# Patient Record
Sex: MALE | URBAN - METROPOLITAN AREA
[De-identification: names, ages, dates, MRNs, and addresses within clinical notes are randomized per-mention and may not be internally consistent; named-entity substitution may affect disease eponyms.]

---

## 2020-11-13 ENCOUNTER — HOSPITAL ENCOUNTER (OUTPATIENT)
Dept: LAB | Age: 30
Discharge: HOME | End: 2020-11-13
Payer: COMMERCIAL

## 2020-11-13 DIAGNOSIS — Z20.822 ENCOUNTER FOR SCREENING LABORATORY TESTING FOR COVID-19 VIRUS: ICD-10-CM

## 2020-11-13 PROCEDURE — 87635 SARS-COV-2 COVID-19 AMP PRB: CPT

## 2020-11-14 LAB
SARS COV-2, XPGCVT: NEGATIVE
SOURCE, COVRS: NORMAL

## 2023-06-19 ENCOUNTER — OFFICE VISIT (OUTPATIENT)
Dept: FAMILY MEDICINE CLINIC | Facility: CLINIC | Age: 33
End: 2023-06-19
Payer: COMMERCIAL

## 2023-06-19 VITALS
SYSTOLIC BLOOD PRESSURE: 128 MMHG | HEART RATE: 63 BPM | HEIGHT: 70 IN | TEMPERATURE: 97.3 F | OXYGEN SATURATION: 98 % | BODY MASS INDEX: 33.11 KG/M2 | WEIGHT: 231.3 LBS | RESPIRATION RATE: 18 BRPM | DIASTOLIC BLOOD PRESSURE: 85 MMHG

## 2023-06-19 DIAGNOSIS — Z11.59 NEED FOR HEPATITIS C SCREENING TEST: ICD-10-CM

## 2023-06-19 DIAGNOSIS — Z00.00 WELL ADULT HEALTH CHECK: Primary | ICD-10-CM

## 2023-06-19 DIAGNOSIS — Z13.220 LIPID SCREENING: ICD-10-CM

## 2023-06-19 DIAGNOSIS — E66.09 CLASS 1 OBESITY DUE TO EXCESS CALORIES WITHOUT SERIOUS COMORBIDITY WITH BODY MASS INDEX (BMI) OF 33.0 TO 33.9 IN ADULT: ICD-10-CM

## 2023-06-19 DIAGNOSIS — Z82.49 FAMILY HISTORY OF CORONARY ARTERY DISEASE IN FATHER: ICD-10-CM

## 2023-06-19 DIAGNOSIS — Z11.4 ENCOUNTER FOR SCREENING FOR HIV: ICD-10-CM

## 2023-06-19 DIAGNOSIS — L70.0 ACNE VULGARIS: ICD-10-CM

## 2023-06-19 DIAGNOSIS — L73.2 AXILLARY HIDRADENITIS SUPPURATIVA: ICD-10-CM

## 2023-06-19 DIAGNOSIS — L05.91 PILONIDAL CYST: ICD-10-CM

## 2023-06-19 PROCEDURE — 99385 PREV VISIT NEW AGE 18-39: CPT | Performed by: FAMILY MEDICINE

## 2023-06-19 SDOH — ECONOMIC STABILITY: FOOD INSECURITY: WITHIN THE PAST 12 MONTHS, THE FOOD YOU BOUGHT JUST DIDN'T LAST AND YOU DIDN'T HAVE MONEY TO GET MORE.: NEVER TRUE

## 2023-06-19 SDOH — ECONOMIC STABILITY: FOOD INSECURITY: WITHIN THE PAST 12 MONTHS, YOU WORRIED THAT YOUR FOOD WOULD RUN OUT BEFORE YOU GOT MONEY TO BUY MORE.: NEVER TRUE

## 2023-06-19 SDOH — ECONOMIC STABILITY: HOUSING INSECURITY
IN THE LAST 12 MONTHS, WAS THERE A TIME WHEN YOU DID NOT HAVE A STEADY PLACE TO SLEEP OR SLEPT IN A SHELTER (INCLUDING NOW)?: NO

## 2023-06-19 SDOH — ECONOMIC STABILITY: INCOME INSECURITY: HOW HARD IS IT FOR YOU TO PAY FOR THE VERY BASICS LIKE FOOD, HOUSING, MEDICAL CARE, AND HEATING?: NOT HARD AT ALL

## 2023-06-19 ASSESSMENT — PATIENT HEALTH QUESTIONNAIRE - PHQ9
SUM OF ALL RESPONSES TO PHQ QUESTIONS 1-9: 0
2. FEELING DOWN, DEPRESSED OR HOPELESS: 0
SUM OF ALL RESPONSES TO PHQ QUESTIONS 1-9: 0
SUM OF ALL RESPONSES TO PHQ9 QUESTIONS 1 & 2: 0
1. LITTLE INTEREST OR PLEASURE IN DOING THINGS: 0

## 2023-06-19 NOTE — PROGRESS NOTES
[facility] [facility]  Phone: [phone] Fax [phone]  Ny Cochran M.D.  6/19/2023        Elie Bartholomew is a 29 y.o. male     HPI:  Patient is seen for New Patient (Get established,)  Patient comes in today to establish himself as a new patient and for physical exam.  He recently saw his dermatologist, Dr. Shahriar Contreras, and has been provided some medication for acne that has mostly been of the neck and chest and back in the past but has also developed more consistently in the axillary regions. Has tender nodularity in the right axilla. Does not describe any exudative weeping recently. Does describe having been treated in the past with Accutane on a couple of occasions. Not currently being treated with such. He does have an appointment with a general surgeon next month as he has had persistence of a pilonidal cyst with dermatology referring him for excision of this. He reports otherwise being fairly healthy. He has noticed some weight gain over the last few years. He now has a 1month-old son and has been encouraged by his wife to have more regular checkups. No current routine exercise although walks a good bit with work. Additionally has felt some need to have more frequent evaluation secondary to his father having passed with myocardial infarction this past December in his early 62s. ROS:  Constitutional: denies excessive fatigue; no fever or chills; no polydipsia polyphagia or polyuria  HEENT: no nasal pressure or drainage, no otalgia or tinnitus or decreased hearing   Pulmonary: no dyspnea cough or wheeze  Cardiac: no chest pain or palpitations, no tachycardia, no PND or orthopnea.   GI: no recurrent dyspepsia or reflux unless he drinks wine in the evenings; normal bowel movements, no nausea or vomiting or diarrhea, no hematochezia or melena, no abdominal pain  : normal urination without dysuria or hematuria, nocturia or

## 2023-07-11 ENCOUNTER — NURSE ONLY (OUTPATIENT)
Dept: FAMILY MEDICINE CLINIC | Facility: CLINIC | Age: 33
End: 2023-07-11

## 2023-07-11 DIAGNOSIS — E66.09 CLASS 1 OBESITY DUE TO EXCESS CALORIES WITHOUT SERIOUS COMORBIDITY WITH BODY MASS INDEX (BMI) OF 33.0 TO 33.9 IN ADULT: ICD-10-CM

## 2023-07-11 DIAGNOSIS — Z00.00 WELL ADULT HEALTH CHECK: ICD-10-CM

## 2023-07-11 DIAGNOSIS — Z82.49 FAMILY HISTORY OF CORONARY ARTERY DISEASE IN FATHER: ICD-10-CM

## 2023-07-11 DIAGNOSIS — Z11.4 ENCOUNTER FOR SCREENING FOR HIV: ICD-10-CM

## 2023-07-11 DIAGNOSIS — L70.0 ACNE VULGARIS: ICD-10-CM

## 2023-07-11 DIAGNOSIS — L05.91 PILONIDAL CYST: ICD-10-CM

## 2023-07-11 DIAGNOSIS — Z11.59 NEED FOR HEPATITIS C SCREENING TEST: ICD-10-CM

## 2023-07-11 DIAGNOSIS — Z13.220 LIPID SCREENING: ICD-10-CM

## 2023-07-11 DIAGNOSIS — L73.2 AXILLARY HIDRADENITIS SUPPURATIVA: ICD-10-CM

## 2023-07-11 LAB
ALBUMIN SERPL-MCNC: 3.7 G/DL (ref 3.5–5)
ALBUMIN/GLOB SERPL: 1 (ref 0.4–1.6)
ALP SERPL-CCNC: 63 U/L (ref 50–136)
ALT SERPL-CCNC: 51 U/L (ref 12–65)
ANION GAP SERPL CALC-SCNC: 5 MMOL/L (ref 2–11)
AST SERPL-CCNC: 28 U/L (ref 15–37)
BASOPHILS # BLD: 0 K/UL (ref 0–0.2)
BASOPHILS NFR BLD: 0 % (ref 0–2)
BILIRUB SERPL-MCNC: 1.6 MG/DL (ref 0.2–1.1)
BUN SERPL-MCNC: 12 MG/DL (ref 6–23)
CALCIUM SERPL-MCNC: 8.8 MG/DL (ref 8.3–10.4)
CHLORIDE SERPL-SCNC: 108 MMOL/L (ref 101–110)
CHOLEST SERPL-MCNC: 192 MG/DL
CO2 SERPL-SCNC: 22 MMOL/L (ref 21–32)
CREAT SERPL-MCNC: 1.3 MG/DL (ref 0.8–1.5)
DIFFERENTIAL METHOD BLD: NORMAL
EOSINOPHIL # BLD: 0.1 K/UL (ref 0–0.8)
EOSINOPHIL NFR BLD: 2 % (ref 0.5–7.8)
ERYTHROCYTE [DISTWIDTH] IN BLOOD BY AUTOMATED COUNT: 13.2 % (ref 11.9–14.6)
GLOBULIN SER CALC-MCNC: 3.8 G/DL (ref 2.8–4.5)
GLUCOSE SERPL-MCNC: 95 MG/DL (ref 65–100)
HCT VFR BLD AUTO: 47.7 % (ref 41.1–50.3)
HDLC SERPL-MCNC: 41 MG/DL (ref 40–60)
HDLC SERPL: 4.7
HGB BLD-MCNC: 15.7 G/DL (ref 13.6–17.2)
IMM GRANULOCYTES # BLD AUTO: 0 K/UL (ref 0–0.5)
IMM GRANULOCYTES NFR BLD AUTO: 1 % (ref 0–5)
LDLC SERPL CALC-MCNC: 122.4 MG/DL
LYMPHOCYTES # BLD: 1.3 K/UL (ref 0.5–4.6)
LYMPHOCYTES NFR BLD: 27 % (ref 13–44)
MCH RBC QN AUTO: 28.5 PG (ref 26.1–32.9)
MCHC RBC AUTO-ENTMCNC: 32.9 G/DL (ref 31.4–35)
MCV RBC AUTO: 86.7 FL (ref 82–102)
MONOCYTES # BLD: 0.6 K/UL (ref 0.1–1.3)
MONOCYTES NFR BLD: 12 % (ref 4–12)
NEUTS SEG # BLD: 2.9 K/UL (ref 1.7–8.2)
NEUTS SEG NFR BLD: 58 % (ref 43–78)
NRBC # BLD: 0 K/UL (ref 0–0.2)
PLATELET # BLD AUTO: 313 K/UL (ref 150–450)
PMV BLD AUTO: 9.9 FL (ref 9.4–12.3)
POTASSIUM SERPL-SCNC: 3.9 MMOL/L (ref 3.5–5.1)
PROT SERPL-MCNC: 7.5 G/DL (ref 6.3–8.2)
RBC # BLD AUTO: 5.5 M/UL (ref 4.23–5.6)
SODIUM SERPL-SCNC: 135 MMOL/L (ref 133–143)
TRIGL SERPL-MCNC: 143 MG/DL (ref 35–150)
TSH, 3RD GENERATION: 1.87 UIU/ML (ref 0.36–3.74)
VLDLC SERPL CALC-MCNC: 28.6 MG/DL (ref 6–23)
WBC # BLD AUTO: 4.9 K/UL (ref 4.3–11.1)

## 2023-07-12 ENCOUNTER — OFFICE VISIT (OUTPATIENT)
Dept: SURGERY | Age: 33
End: 2023-07-12
Payer: COMMERCIAL

## 2023-07-12 VITALS
OXYGEN SATURATION: 98 % | WEIGHT: 230 LBS | BODY MASS INDEX: 32.93 KG/M2 | RESPIRATION RATE: 16 BRPM | HEIGHT: 70 IN | HEART RATE: 73 BPM | SYSTOLIC BLOOD PRESSURE: 129 MMHG | DIASTOLIC BLOOD PRESSURE: 72 MMHG

## 2023-07-12 DIAGNOSIS — L02.31 CELLULITIS AND ABSCESS OF BUTTOCK: ICD-10-CM

## 2023-07-12 DIAGNOSIS — L05.91 PILONIDAL CYST: Primary | ICD-10-CM

## 2023-07-12 DIAGNOSIS — L03.317 CELLULITIS AND ABSCESS OF BUTTOCK: ICD-10-CM

## 2023-07-12 LAB
HCV AB SER QL: NONREACTIVE
HIV 1+2 AB+HIV1 P24 AG SERPL QL IA: NONREACTIVE
HIV 1/2 RESULT COMMENT: NORMAL

## 2023-07-12 PROCEDURE — 99204 OFFICE O/P NEW MOD 45 MIN: CPT | Performed by: SURGERY

## 2023-07-12 NOTE — PROGRESS NOTES
H&P/Consult Note/Progress Note/Office Note:   Machelle Garrett  MRN: [id number]  :1990  Age:33 y.o.    HPI: Machelle Garrett is a 29 y.o. male who was referred by Dr. Nasreen Flores for evaluation of sacral pilonidal cyst and sinus disease. He reported a long history of intermittent episodes of sacral pilonidal cyst and sinus inflammation. He denies any prior incision and drainage procedures or any formal resections. He he recently noticed acute worsening of his condition with swelling    He was placed on Bactrim for acne and remains on that as of his first visit with us on 2023. Past Medical History:   Diagnosis Date    Acne     Dr. Maribel Doe     Past Surgical History:   Procedure Laterality Date    HERNIA REPAIR Bilateral      No current outpatient medications on file. No current facility-administered medications for this visit. ALLERGIES:  Patient has no known allergies. Social History     Socioeconomic History    Marital status:      Spouse name: None    Number of children: None    Years of education: None    Highest education level: None   Tobacco Use    Smoking status: Never     Passive exposure: Never    Smokeless tobacco: Never   Vaping Use    Vaping Use: Never used   Substance and Sexual Activity    Alcohol use:  Yes     Alcohol/week: 9.0 standard drinks     Types: 8 Cans of beer, 1 Shots of liquor per week    Drug use: Never    Sexual activity: Yes     Social Determinants of Health     Financial Resource Strain: Low Risk     Difficulty of Paying Living Expenses: Not hard at all   Food Insecurity: No Food Insecurity    Worried About Running Out of Food in the Last Year: Never true    Ran Out of Food in the Last Year: Never true   Transportation Needs: Unknown    Lack of Transportation (Non-Medical): No   Housing Stability: Unknown    Unstable Housing in the Last Year: No     Social History     Tobacco Use   Smoking Status Never    Passive exposure: Never   Smokeless

## 2023-07-24 ENCOUNTER — OFFICE VISIT (OUTPATIENT)
Dept: SURGERY | Age: 33
End: 2023-07-24
Payer: COMMERCIAL

## 2023-07-24 VITALS — BODY MASS INDEX: 32.93 KG/M2 | WEIGHT: 230 LBS | HEIGHT: 70 IN

## 2023-07-24 DIAGNOSIS — L02.31 CELLULITIS AND ABSCESS OF BUTTOCK: Primary | ICD-10-CM

## 2023-07-24 DIAGNOSIS — L05.91 PILONIDAL CYST: ICD-10-CM

## 2023-07-24 DIAGNOSIS — L03.317 CELLULITIS AND ABSCESS OF BUTTOCK: Primary | ICD-10-CM

## 2023-07-24 PROCEDURE — 99214 OFFICE O/P EST MOD 30 MIN: CPT | Performed by: SURGERY

## 2023-07-24 RX ORDER — AMOXICILLIN AND CLAVULANATE POTASSIUM 875; 125 MG/1; MG/1
1 TABLET, FILM COATED ORAL 2 TIMES DAILY
Qty: 28 TABLET | Refills: 1 | Status: SHIPPED | OUTPATIENT
Start: 2023-07-24 | End: 2023-08-07

## 2023-07-24 NOTE — PROGRESS NOTES
H&P/Consult Note/Progress Note/Office Note:   Mahi Faria  MRN: [id number]  :1990  Age:33 y.o.    HPI: Mahi Faria is a 29 y.o. male who was referred by Dr. Scott Potter for evaluation of sacral pilonidal cyst and sinus disease. He reported a long h/o intermittent episodes of sacral pilonidal cyst and sinus inflammation. He denied any prior I&D procedures or any prior pilonidal resections. He he recently noticed acute worsening of his condition with swelling. He was placed on Bactrim for acne and remained on that as of his first visit with us on 23. Past Medical History:   Diagnosis Date    Acne     Dr. Randall Wallis     Past Surgical History:   Procedure Laterality Date    HERNIA REPAIR Bilateral      No current outpatient medications on file. No current facility-administered medications for this visit. ALLERGIES:  Patient has no known allergies. Social History     Socioeconomic History    Marital status:    Tobacco Use    Smoking status: Never     Passive exposure: Never    Smokeless tobacco: Never   Vaping Use    Vaping Use: Never used   Substance and Sexual Activity    Alcohol use:  Yes     Alcohol/week: 9.0 standard drinks     Types: 8 Cans of beer, 1 Shots of liquor per week    Drug use: Never    Sexual activity: Yes     Social Determinants of Health     Financial Resource Strain: Low Risk     Difficulty of Paying Living Expenses: Not hard at all   Food Insecurity: No Food Insecurity    Worried About Running Out of Food in the Last Year: Never true    Ran Out of Food in the Last Year: Never true   Transportation Needs: Unknown    Lack of Transportation (Non-Medical): No   Housing Stability: Unknown    Unstable Housing in the Last Year: No     Social History     Tobacco Use   Smoking Status Never    Passive exposure: Never   Smokeless Tobacco Never     Family History   Problem Relation Age of Onset    Hypertension Mother     Diabetes Father     Parkinson's

## 2023-12-11 DIAGNOSIS — K42.9 UMBILICAL HERNIA WITHOUT OBSTRUCTION AND WITHOUT GANGRENE: Primary | ICD-10-CM

## 2023-12-11 NOTE — PROGRESS NOTES
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 He described a skin growth of the abdomen. Evaluation revealed an umbilical herniation that is fairly easily reducible but distinguishable when patient participates in a Valsalva maneuver.     I will refer him to  who continues to evaluate him regarding a pilonidal cyst.

## 2024-01-03 ENCOUNTER — OFFICE VISIT (OUTPATIENT)
Dept: SURGERY | Age: 34
End: 2024-01-03
Payer: COMMERCIAL

## 2024-01-03 VITALS
OXYGEN SATURATION: 98 % | HEART RATE: 63 BPM | HEIGHT: 70 IN | SYSTOLIC BLOOD PRESSURE: 104 MMHG | BODY MASS INDEX: 32.64 KG/M2 | WEIGHT: 228 LBS | DIASTOLIC BLOOD PRESSURE: 78 MMHG

## 2024-01-03 DIAGNOSIS — L03.317 CELLULITIS AND ABSCESS OF BUTTOCK: ICD-10-CM

## 2024-01-03 DIAGNOSIS — L73.9 FOLLICULITIS: ICD-10-CM

## 2024-01-03 DIAGNOSIS — L05.91 PILONIDAL CYST: ICD-10-CM

## 2024-01-03 DIAGNOSIS — L02.31 CELLULITIS AND ABSCESS OF BUTTOCK: ICD-10-CM

## 2024-01-03 DIAGNOSIS — K42.9 UMBILICAL HERNIA WITHOUT OBSTRUCTION AND WITHOUT GANGRENE: Primary | ICD-10-CM

## 2024-01-03 PROCEDURE — 99214 OFFICE O/P EST MOD 30 MIN: CPT | Performed by: SURGERY

## 2024-01-03 RX ORDER — AMOXICILLIN AND CLAVULANATE POTASSIUM 875; 125 MG/1; MG/1
1 TABLET, FILM COATED ORAL 2 TIMES DAILY
Qty: 28 TABLET | Refills: 0 | Status: SHIPPED | OUTPATIENT
Start: 2024-01-03 | End: 2024-01-17

## 2024-01-03 NOTE — PROGRESS NOTES
H&P/Consult Note/Progress Note/Office Note:   Kanu Collins  MRN: [id number]  :1990  Age:33 y.o.    HPI: Kanu Collins is a 33 y.o. male who was referred by Dr. Marlon Ramos for evaluation of sacral pilonidal cyst and sinus disease.      He reported a long h/o intermittent episodes of sacral pilonidal cyst and sinus inflammation.  He denied any prior I&D procedures or any prior pilonidal resections.  He he recently noticed acute worsening of his condition with swelling.    He was placed on Bactrim for acne and remained on that as of his first visit with us on 23.    23 no show for office visit  23 no show for office visit          Past Medical History:   Diagnosis Date    Acne     Dr. Ramos     Past Surgical History:   Procedure Laterality Date    HERNIA REPAIR Bilateral      No current outpatient medications on file.     No current facility-administered medications for this visit.     ALLERGIES:  Patient has no known allergies.    Social History     Socioeconomic History    Marital status:      Spouse name: None    Number of children: None    Years of education: None    Highest education level: None   Tobacco Use    Smoking status: Never     Passive exposure: Never    Smokeless tobacco: Never   Vaping Use    Vaping Use: Never used   Substance and Sexual Activity    Alcohol use: Yes     Alcohol/week: 9.0 standard drinks of alcohol     Types: 8 Cans of beer, 1 Shots of liquor per week    Drug use: Never    Sexual activity: Yes     Social Determinants of Health     Financial Resource Strain: Low Risk  (2023)    Overall Financial Resource Strain (CARDIA)     Difficulty of Paying Living Expenses: Not hard at all   Transportation Needs: Unknown (2023)    PRAPARE - Transportation     Lack of Transportation (Non-Medical): No   Housing Stability: Unknown (2023)    Housing Stability Vital Sign     Unstable Housing in the Last Year: No     Social History     Tobacco Use

## 2024-01-04 LAB
EST. AVERAGE GLUCOSE BLD GHB EST-MCNC: 100 MG/DL
HBA1C MFR BLD: 5.1 % (ref 4.8–5.6)

## 2024-01-22 ENCOUNTER — OFFICE VISIT (OUTPATIENT)
Dept: SURGERY | Age: 34
End: 2024-01-22
Payer: COMMERCIAL

## 2024-01-22 ENCOUNTER — PREP FOR PROCEDURE (OUTPATIENT)
Dept: SURGERY | Age: 34
End: 2024-01-22

## 2024-01-22 VITALS — HEIGHT: 70 IN | BODY MASS INDEX: 32.64 KG/M2 | WEIGHT: 228 LBS

## 2024-01-22 DIAGNOSIS — L98.8 PILONIDAL DISEASE: ICD-10-CM

## 2024-01-22 DIAGNOSIS — K42.9 UMBILICAL HERNIA WITHOUT OBSTRUCTION AND WITHOUT GANGRENE: Primary | ICD-10-CM

## 2024-01-22 PROCEDURE — 99214 OFFICE O/P EST MOD 30 MIN: CPT | Performed by: SURGERY

## 2024-01-22 RX ORDER — AMOXICILLIN AND CLAVULANATE POTASSIUM 875; 125 MG/1; MG/1
1 TABLET, FILM COATED ORAL 2 TIMES DAILY
Qty: 28 TABLET | Refills: 0 | Status: SHIPPED | OUTPATIENT
Start: 2024-01-22 | End: 2024-02-05

## 2024-01-22 NOTE — PROGRESS NOTES
below  *Each unique test, order, or document contributes to the combination of 2 or combination of 3 in Category 1 below.  For this visit I also reviewed old records and prior notes.      No results for input(s): \"WBC\", \"HGB\", \"PLT\", \"NA\", \"K\", \"CL\", \"CO2\", \"BUN\", \"CREA\", \"GLU\", \"INR\", \"APTT\", \"ALT\", \"AML\", \"LCAD\", \"PH\", \"PCO2\", \"PO2\", \"HCO3\" in the last 72 hours.    Invalid input(s): \"PTP\", \"TBIL\", \"TBILI\", \"CBIL\", \"SGOT\", \"GPT\", \"AP\", \"LPSE\", \"NH4\", \"TROPT\", \"TROIQ\"    Review of most recent CBC  Lab Results   Component Value Date    WBC 4.9 07/11/2023    HGB 15.7 07/11/2023    HCT 47.7 07/11/2023    MCV 86.7 07/11/2023     07/11/2023       Review of most recent BMP  Lab Results   Component Value Date/Time     07/11/2023 08:22 AM    K 3.9 07/11/2023 08:22 AM     07/11/2023 08:22 AM    CO2 22 07/11/2023 08:22 AM    BUN 12 07/11/2023 08:22 AM    CREATININE 1.30 07/11/2023 08:22 AM    GLUCOSE 95 07/11/2023 08:22 AM    CALCIUM 8.8 07/11/2023 08:22 AM    LABGLOM >60 07/11/2023 08:22 AM        Review of most recent LFTs (and lipase if done)  Lab Results   Component Value Date    ALT 51 07/11/2023    AST 28 07/11/2023    ALKPHOS 63 07/11/2023    BILITOT 1.6 (H) 07/11/2023     No results found for: \"LIPASE\"    No results found for: \"INR\", \"APTT\", \"LCAD\"    Review of most recent HgbA1c  Hemoglobin A1C   Date Value Ref Range Status   01/03/2024 5.1 4.8 - 5.6 % Final       Nutritional assessment screen for wound healing issues:  Lab Results   Component Value Date    LABALBU 3.7 07/11/2023       @lastcovr@    Xray Result (most recent):  No results found for this or any previous visit from the past 3650 days.    CT Result (most recent):  No results found for this or any previous visit from the past 3650 days.    US Result (most recent):  No results found for this or any previous visit from the past 3650 days.      Admission date (for inpatients): (Not on file)   * No surgery found *  * No surgery found

## 2024-02-06 RX ORDER — SODIUM CHLORIDE 0.9 % (FLUSH) 0.9 %
5-40 SYRINGE (ML) INJECTION PRN
Status: CANCELLED | OUTPATIENT
Start: 2024-02-06

## 2024-02-06 RX ORDER — SODIUM CHLORIDE 0.9 % (FLUSH) 0.9 %
5-40 SYRINGE (ML) INJECTION EVERY 12 HOURS SCHEDULED
Status: CANCELLED | OUTPATIENT
Start: 2024-02-06

## 2024-02-06 RX ORDER — SODIUM CHLORIDE 9 MG/ML
INJECTION, SOLUTION INTRAVENOUS PRN
Status: CANCELLED | OUTPATIENT
Start: 2024-02-06

## 2024-02-24 NOTE — PERIOP NOTE
Patient verified name and .  Order for consent found in EHR and matches case posting; patient verifies procedure.   Type 1B surgery, phone assessment complete.  Orders received.  Labs per surgeon: CBC, CMP DOS; orders signed and held in EHR.   Labs per anesthesia protocol: None.    Patient answered medical/surgical history questions at their best of ability. All prior to admission medications documented in EPIC.  Patient instructed to take the following medications the day of surgery according to anesthesia guidelines with a small sip of water: None. Pt instructed to take antibiotic rx by surgeon as instructed.  Hold all vitamins, supplements, herbals, and NSAIDs starting now.      Patient instructed on the following:    > Arrive at [facility] Entr[facility], time of arrival to be called the day before by 1700  > NPO after midnight, unless otherwise indicated, including gum, mints, and ice chips  > Responsible adult must drive patient to the hospital, stay during surgery, and patient will need supervision 24 hours after anesthesia  > Use non moisturizing/antibacterial soap in shower the night before surgery and on the morning of surgery  > All piercings must be removed prior to arrival.    > Leave all valuables (money and jewelry) at home but bring insurance card and ID on DOS.   > Do not wear make-up, nail polish, lotions, cologne, perfumes, powders, or oil on skin. Artificial nails are not permitted.

## 2024-02-26 ENCOUNTER — ANESTHESIA EVENT (OUTPATIENT)
Dept: SURGERY | Age: 34
End: 2024-02-26
Payer: COMMERCIAL

## 2024-02-26 NOTE — PERIOP NOTE
Preop department called to notify patient of arrival time for scheduled procedure. Instructions given to   - Arrive at OPC Entrance [address].  - Remain NPO after midnight, unless otherwise indicated, including gum, mints, and ice chips.   - Have a responsible adult to drive patient to the hospital, stay during surgery, and patient will need supervision 24 hours after anesthesia.   - Use antibacterial soap in shower the night before surgery and on the morning of surgery.       Was patient contacted: yes-pt  Voicemail left: n/a  Numbers contacted: [phone]   Arrival time: 0830

## 2024-02-27 ENCOUNTER — ANESTHESIA (OUTPATIENT)
Dept: SURGERY | Age: 34
End: 2024-02-27
Payer: COMMERCIAL

## 2024-02-27 ENCOUNTER — HOSPITAL ENCOUNTER (OUTPATIENT)
Age: 34
Setting detail: OUTPATIENT SURGERY
Discharge: HOME | End: 2024-02-27
Attending: SURGERY | Admitting: SURGERY
Payer: COMMERCIAL

## 2024-02-27 VITALS
DIASTOLIC BLOOD PRESSURE: 75 MMHG | OXYGEN SATURATION: 98 % | WEIGHT: 228 LBS | TEMPERATURE: 97.6 F | RESPIRATION RATE: 16 BRPM | BODY MASS INDEX: 32.64 KG/M2 | HEIGHT: 70 IN | SYSTOLIC BLOOD PRESSURE: 116 MMHG | HEART RATE: 86 BPM

## 2024-02-27 DIAGNOSIS — L05.91 PILONIDAL CYST: ICD-10-CM

## 2024-02-27 DIAGNOSIS — L98.8 PILONIDAL DISEASE: Primary | ICD-10-CM

## 2024-02-27 PROCEDURE — 6360000002 HC RX W HCPCS: Performed by: SURGERY

## 2024-02-27 PROCEDURE — 3600000012 HC SURGERY LEVEL 2 ADDTL 15MIN: Performed by: SURGERY

## 2024-02-27 PROCEDURE — 7100000000 HC PACU RECOVERY - FIRST 15 MIN: Performed by: SURGERY

## 2024-02-27 PROCEDURE — 2580000003 HC RX 258: Performed by: STUDENT IN AN ORGANIZED HEALTH CARE EDUCATION/TRAINING PROGRAM

## 2024-02-27 PROCEDURE — 3700000001 HC ADD 15 MINUTES (ANESTHESIA): Performed by: SURGERY

## 2024-02-27 PROCEDURE — 99024 POSTOP FOLLOW-UP VISIT: CPT | Performed by: SURGERY

## 2024-02-27 PROCEDURE — 14302 TIS TRNFR ADDL 30 SQ CM: CPT | Performed by: SURGERY

## 2024-02-27 PROCEDURE — 2500000003 HC RX 250 WO HCPCS: Performed by: STUDENT IN AN ORGANIZED HEALTH CARE EDUCATION/TRAINING PROGRAM

## 2024-02-27 PROCEDURE — 3600000002 HC SURGERY LEVEL 2 BASE: Performed by: SURGERY

## 2024-02-27 PROCEDURE — 3700000000 HC ANESTHESIA ATTENDED CARE: Performed by: SURGERY

## 2024-02-27 PROCEDURE — 7100000010 HC PHASE II RECOVERY - FIRST 15 MIN: Performed by: SURGERY

## 2024-02-27 PROCEDURE — 11772 EXC PILONIDAL CYST COMP: CPT | Performed by: SURGERY

## 2024-02-27 PROCEDURE — 14301 TIS TRNFR ANY 30.1-60 SQ CM: CPT | Performed by: SURGERY

## 2024-02-27 PROCEDURE — 2709999900 HC NON-CHARGEABLE SUPPLY: Performed by: SURGERY

## 2024-02-27 PROCEDURE — 88304 TISSUE EXAM BY PATHOLOGIST: CPT

## 2024-02-27 PROCEDURE — 6360000002 HC RX W HCPCS: Performed by: STUDENT IN AN ORGANIZED HEALTH CARE EDUCATION/TRAINING PROGRAM

## 2024-02-27 PROCEDURE — 7100000001 HC PACU RECOVERY - ADDTL 15 MIN: Performed by: SURGERY

## 2024-02-27 RX ORDER — SODIUM CHLORIDE 9 MG/ML
INJECTION, SOLUTION INTRAVENOUS PRN
Status: DISCONTINUED | OUTPATIENT
Start: 2024-02-27 | End: 2024-02-27 | Stop reason: HOSPADM

## 2024-02-27 RX ORDER — SODIUM CHLORIDE 0.9 % (FLUSH) 0.9 %
5-40 SYRINGE (ML) INJECTION PRN
Status: DISCONTINUED | OUTPATIENT
Start: 2024-02-27 | End: 2024-02-27 | Stop reason: HOSPADM

## 2024-02-27 RX ORDER — HYDROMORPHONE HYDROCHLORIDE 2 MG/ML
0.5 INJECTION, SOLUTION INTRAMUSCULAR; INTRAVENOUS; SUBCUTANEOUS EVERY 5 MIN PRN
Status: DISCONTINUED | OUTPATIENT
Start: 2024-02-27 | End: 2024-02-27 | Stop reason: HOSPADM

## 2024-02-27 RX ORDER — DEXAMETHASONE SODIUM PHOSPHATE 10 MG/ML
INJECTION INTRAMUSCULAR; INTRAVENOUS PRN
Status: DISCONTINUED | OUTPATIENT
Start: 2024-02-27 | End: 2024-02-27 | Stop reason: SDUPTHER

## 2024-02-27 RX ORDER — OXYCODONE HYDROCHLORIDE 5 MG/1
5 TABLET ORAL PRN
Status: DISCONTINUED | OUTPATIENT
Start: 2024-02-27 | End: 2024-02-27 | Stop reason: HOSPADM

## 2024-02-27 RX ORDER — PROPOFOL 10 MG/ML
INJECTION, EMULSION INTRAVENOUS PRN
Status: DISCONTINUED | OUTPATIENT
Start: 2024-02-27 | End: 2024-02-27 | Stop reason: SDUPTHER

## 2024-02-27 RX ORDER — HYDRALAZINE HYDROCHLORIDE 20 MG/ML
10 INJECTION INTRAMUSCULAR; INTRAVENOUS
Status: DISCONTINUED | OUTPATIENT
Start: 2024-02-27 | End: 2024-02-27 | Stop reason: HOSPADM

## 2024-02-27 RX ORDER — FENTANYL CITRATE 50 UG/ML
INJECTION, SOLUTION INTRAMUSCULAR; INTRAVENOUS PRN
Status: DISCONTINUED | OUTPATIENT
Start: 2024-02-27 | End: 2024-02-27 | Stop reason: SDUPTHER

## 2024-02-27 RX ORDER — OXYCODONE HYDROCHLORIDE 5 MG/1
10 TABLET ORAL PRN
Status: DISCONTINUED | OUTPATIENT
Start: 2024-02-27 | End: 2024-02-27 | Stop reason: HOSPADM

## 2024-02-27 RX ORDER — FENTANYL CITRATE 50 UG/ML
100 INJECTION, SOLUTION INTRAMUSCULAR; INTRAVENOUS
Status: DISCONTINUED | OUTPATIENT
Start: 2024-02-27 | End: 2024-02-27 | Stop reason: HOSPADM

## 2024-02-27 RX ORDER — LABETALOL HYDROCHLORIDE 5 MG/ML
10 INJECTION, SOLUTION INTRAVENOUS
Status: DISCONTINUED | OUTPATIENT
Start: 2024-02-27 | End: 2024-02-27 | Stop reason: HOSPADM

## 2024-02-27 RX ORDER — MIDAZOLAM HYDROCHLORIDE 2 MG/2ML
2 INJECTION, SOLUTION INTRAMUSCULAR; INTRAVENOUS
Status: DISCONTINUED | OUTPATIENT
Start: 2024-02-27 | End: 2024-02-27 | Stop reason: HOSPADM

## 2024-02-27 RX ORDER — BUPIVACAINE HYDROCHLORIDE 2.5 MG/ML
INJECTION, SOLUTION EPIDURAL; INFILTRATION; INTRACAUDAL PRN
Status: DISCONTINUED | OUTPATIENT
Start: 2024-02-27 | End: 2024-02-27 | Stop reason: ALTCHOICE

## 2024-02-27 RX ORDER — KETAMINE HYDROCHLORIDE 50 MG/ML
INJECTION, SOLUTION INTRAMUSCULAR; INTRAVENOUS PRN
Status: DISCONTINUED | OUTPATIENT
Start: 2024-02-27 | End: 2024-02-27 | Stop reason: SDUPTHER

## 2024-02-27 RX ORDER — HALOPERIDOL 5 MG/ML
1 INJECTION INTRAMUSCULAR
Status: DISCONTINUED | OUTPATIENT
Start: 2024-02-27 | End: 2024-02-27 | Stop reason: HOSPADM

## 2024-02-27 RX ORDER — DEXMEDETOMIDINE HYDROCHLORIDE 100 UG/ML
INJECTION, SOLUTION INTRAVENOUS PRN
Status: DISCONTINUED | OUTPATIENT
Start: 2024-02-27 | End: 2024-02-27 | Stop reason: SDUPTHER

## 2024-02-27 RX ORDER — NEOSTIGMINE METHYLSULFATE 1 MG/ML
INJECTION, SOLUTION INTRAVENOUS PRN
Status: DISCONTINUED | OUTPATIENT
Start: 2024-02-27 | End: 2024-02-27 | Stop reason: SDUPTHER

## 2024-02-27 RX ORDER — HYDROCODONE BITARTRATE AND ACETAMINOPHEN 5; 325 MG/1; MG/1
1-2 TABLET ORAL EVERY 8 HOURS PRN
Qty: 28 TABLET | Refills: 0 | Status: SHIPPED | OUTPATIENT
Start: 2024-02-27 | End: 2024-03-05

## 2024-02-27 RX ORDER — GLYCOPYRROLATE 0.2 MG/ML
INJECTION INTRAMUSCULAR; INTRAVENOUS PRN
Status: DISCONTINUED | OUTPATIENT
Start: 2024-02-27 | End: 2024-02-27 | Stop reason: SDUPTHER

## 2024-02-27 RX ORDER — IPRATROPIUM BROMIDE AND ALBUTEROL SULFATE 2.5; .5 MG/3ML; MG/3ML
1 SOLUTION RESPIRATORY (INHALATION)
Status: DISCONTINUED | OUTPATIENT
Start: 2024-02-27 | End: 2024-02-27 | Stop reason: HOSPADM

## 2024-02-27 RX ORDER — FENTANYL CITRATE 50 UG/ML
25 INJECTION, SOLUTION INTRAMUSCULAR; INTRAVENOUS
Status: DISCONTINUED | OUTPATIENT
Start: 2024-02-27 | End: 2024-02-27 | Stop reason: HOSPADM

## 2024-02-27 RX ORDER — SODIUM CHLORIDE 0.9 % (FLUSH) 0.9 %
5-40 SYRINGE (ML) INJECTION EVERY 12 HOURS SCHEDULED
Status: DISCONTINUED | OUTPATIENT
Start: 2024-02-27 | End: 2024-02-27 | Stop reason: HOSPADM

## 2024-02-27 RX ORDER — ROCURONIUM BROMIDE 10 MG/ML
INJECTION, SOLUTION INTRAVENOUS PRN
Status: DISCONTINUED | OUTPATIENT
Start: 2024-02-27 | End: 2024-02-27 | Stop reason: SDUPTHER

## 2024-02-27 RX ORDER — SODIUM CHLORIDE, SODIUM LACTATE, POTASSIUM CHLORIDE, CALCIUM CHLORIDE 600; 310; 30; 20 MG/100ML; MG/100ML; MG/100ML; MG/100ML
INJECTION, SOLUTION INTRAVENOUS CONTINUOUS
Status: DISCONTINUED | OUTPATIENT
Start: 2024-02-27 | End: 2024-02-27 | Stop reason: HOSPADM

## 2024-02-27 RX ORDER — LIDOCAINE HYDROCHLORIDE 10 MG/ML
1 INJECTION, SOLUTION INFILTRATION; PERINEURAL
Status: DISCONTINUED | OUTPATIENT
Start: 2024-02-27 | End: 2024-02-27 | Stop reason: HOSPADM

## 2024-02-27 RX ORDER — ONDANSETRON 2 MG/ML
INJECTION INTRAMUSCULAR; INTRAVENOUS PRN
Status: DISCONTINUED | OUTPATIENT
Start: 2024-02-27 | End: 2024-02-27 | Stop reason: SDUPTHER

## 2024-02-27 RX ORDER — DIPHENHYDRAMINE HYDROCHLORIDE 50 MG/ML
12.5 INJECTION INTRAMUSCULAR; INTRAVENOUS
Status: DISCONTINUED | OUTPATIENT
Start: 2024-02-27 | End: 2024-02-27 | Stop reason: HOSPADM

## 2024-02-27 RX ORDER — PROCHLORPERAZINE EDISYLATE 5 MG/ML
5 INJECTION INTRAMUSCULAR; INTRAVENOUS
Status: DISCONTINUED | OUTPATIENT
Start: 2024-02-27 | End: 2024-02-27 | Stop reason: HOSPADM

## 2024-02-27 RX ORDER — LIDOCAINE HYDROCHLORIDE 20 MG/ML
INJECTION, SOLUTION EPIDURAL; INFILTRATION; INTRACAUDAL; PERINEURAL PRN
Status: DISCONTINUED | OUTPATIENT
Start: 2024-02-27 | End: 2024-02-27 | Stop reason: SDUPTHER

## 2024-02-27 RX ADMIN — SODIUM CHLORIDE, POTASSIUM CHLORIDE, SODIUM LACTATE AND CALCIUM CHLORIDE: 600; 310; 30; 20 INJECTION, SOLUTION INTRAVENOUS at 09:00

## 2024-02-27 RX ADMIN — LIDOCAINE HYDROCHLORIDE 100 MG: 20 INJECTION, SOLUTION EPIDURAL; INFILTRATION; INTRACAUDAL; PERINEURAL at 11:05

## 2024-02-27 RX ADMIN — FENTANYL CITRATE 25 MCG: 50 INJECTION, SOLUTION INTRAMUSCULAR; INTRAVENOUS at 11:51

## 2024-02-27 RX ADMIN — DEXMEDETOMIDINE 12 MCG: 100 INJECTION, SOLUTION, CONCENTRATE INTRAVENOUS at 11:44

## 2024-02-27 RX ADMIN — GLYCOPYRROLATE 0.3 MG: 0.2 INJECTION INTRAMUSCULAR; INTRAVENOUS at 12:12

## 2024-02-27 RX ADMIN — KETAMINE HYDROCHLORIDE 20 MG: 50 INJECTION, SOLUTION INTRAMUSCULAR; INTRAVENOUS at 11:25

## 2024-02-27 RX ADMIN — ROCURONIUM BROMIDE 30 MG: 50 INJECTION, SOLUTION INTRAVENOUS at 11:05

## 2024-02-27 RX ADMIN — FENTANYL CITRATE 50 MCG: 50 INJECTION, SOLUTION INTRAMUSCULAR; INTRAVENOUS at 11:05

## 2024-02-27 RX ADMIN — Medication 2 G: at 11:17

## 2024-02-27 RX ADMIN — PROPOFOL 200 MG: 10 INJECTION, EMULSION INTRAVENOUS at 11:05

## 2024-02-27 RX ADMIN — ONDANSETRON 4 MG: 2 INJECTION INTRAMUSCULAR; INTRAVENOUS at 11:19

## 2024-02-27 RX ADMIN — Medication 3 MG: at 12:12

## 2024-02-27 RX ADMIN — FENTANYL CITRATE 50 MCG: 50 INJECTION, SOLUTION INTRAMUSCULAR; INTRAVENOUS at 11:03

## 2024-02-27 RX ADMIN — DEXAMETHASONE SODIUM PHOSPHATE 10 MG: 10 INJECTION INTRAMUSCULAR; INTRAVENOUS at 11:19

## 2024-02-27 RX ADMIN — PROPOFOL 30 MG: 10 INJECTION, EMULSION INTRAVENOUS at 11:44

## 2024-02-27 RX ADMIN — PROPOFOL 100 MG: 10 INJECTION, EMULSION INTRAVENOUS at 11:08

## 2024-02-27 ASSESSMENT — PAIN - FUNCTIONAL ASSESSMENT: PAIN_FUNCTIONAL_ASSESSMENT: 0-10

## 2024-02-27 NOTE — H&P
H&P/Consult Note/Progress Note/Office Note:   Kanu Collins  MRN: [id number]  :1990  Age:33 y.o.    HPI: Kanu Collins is a 33 y.o. male who is here for sacral pilonidal cyst ad sinus excision with advancement flap closure on 24.        Prior to surgery he was initially referred by Dr. Marlon Ramos for evaluation of sacral pilonidal cyst and sinus disease.  He reported a long h/o intermittent episodes of sacral pilonidal cyst and sinus inflammation.  No prior pilonidal I&D procedures or resections.  He noticed acute worsening of his condition with swelling in late 2023.  He was placed on Bactrim for acne and remained on that as of his first visit with us on 23.  He also has an umbilical hernia which is stable    23 no show for office visit  23 no show for office visit    24 office visit. No prior umbilical repairs. Sacral pilonidal disease persists   He had prior BIH repairs as an infant in [place]     24 OR for excision            Past Medical History:   Diagnosis Date    Acne     Dr. Ramos    Pilonidal cyst      Past Surgical History:   Procedure Laterality Date    HERNIA REPAIR Bilateral      No current facility-administered medications for this encounter.     Current Outpatient Medications   Medication Sig    NONFORMULARY Pt currently taking an antibiotic that was rx by surgeon--pt unsure of name/dosage     ALLERGIES:  Patient has no known allergies.    Social History     Socioeconomic History    Marital status:      Spouse name: None    Number of children: None    Years of education: None    Highest education level: None   Tobacco Use    Smoking status: Never     Passive exposure: Never    Smokeless tobacco: Never   Vaping Use    Vaping Use: Never used   Substance and Sexual Activity    Alcohol use: Yes     Alcohol/week: 9.0 standard drinks of alcohol     Types: 8 Cans of beer, 1 Shots of liquor per week    Drug use: Never    Sexual activity: Yes

## 2024-02-27 NOTE — ANESTHESIA PROCEDURE NOTES
Airway  Date/Time: 2/27/2024 11:10 AM  Urgency: elective    Airway not difficult    General Information and Staff    Patient location during procedure: OR  Resident/CRNA: Terwilliger, Chelsea, APRN - CRNA  Performed: resident/CRNA   Performed by: Terwilliger, Chelsea, APRN - CRNA  Authorized by: Shalom Pickard MD      Indications and Patient Condition  Indications for airway management: anesthesia  Spontaneous Ventilation: absent  Sedation level: deep  Preoxygenated: yes  Patient position: sniffing  MILS not maintained throughout  Mask difficulty assessment: vent by bag mask    Final Airway Details  Final airway type: endotracheal airway      Successful airway: ETT  Cuffed: yes   Successful intubation technique: direct laryngoscopy  Facilitating devices/methods: intubating stylet  Endotracheal tube insertion site: oral  Blade: Amarilis  Blade size: #4  ETT size (mm): 8.0  Cormack-Lehane Classification: grade I - full view of glottis  Placement verified by: chest auscultation and capnometry   Measured from: lips  ETT to lips (cm): 24  Number of attempts at approach: 1  Ventilation between attempts: bag mask    Additional Comments  Atraumatic insertion, lips and teeth as preeval

## 2024-02-27 NOTE — DISCHARGE INSTRUCTIONS
healthy diet, regular physical exercise & weight monitoring are important for maintaining a healthy lifestyle    You may be retaining fluid if you have a history of heart failure or if you experience any of the following symptoms:  Weight gain of 3 pounds or more overnight or 5 pounds in a week, increased swelling in our hands or feet or shortness of breath while lying flat in bed.  Please call your doctor as soon as you notice any of these symptoms; do not wait until your next office visit.

## 2024-02-27 NOTE — ANESTHESIA POSTPROCEDURE EVALUATION
Department of Anesthesiology  Postprocedure Note    Patient: Kanu Collins  MRN: [id number]  YOB: 1990  Date of evaluation: 2/27/2024    Procedure Summary       Date: 02/27/24 Room / Location: [facility] OP OR 01 / [facility]    Anesthesia Start: 1058 Anesthesia Stop: 1238    Procedure: EXCISION SACRAL PILONIDAL CYST & SINUS INFECTION WITH FLAP CLOSURE (Buttocks) Diagnosis:       Pilonidal disease      (Pilonidal disease [L98.8])    Surgeons: Sukumar Osorio MD Responsible Provider: Shalom Pickard MD    Anesthesia Type: General ASA Status: 1            Anesthesia Type: General    Josie Phase I: Josie Score: 8    Josie Phase II: Josie Score: 10    Anesthesia Post Evaluation    Patient location during evaluation: bedside  Patient participation: complete - patient participated  Level of consciousness: awake and alert  Airway patency: patent  Nausea & Vomiting: no vomiting  Cardiovascular status: hemodynamically stable  Respiratory status: acceptable  Hydration status: euvolemic  Pain management: adequate    No notable events documented.

## 2024-02-27 NOTE — ANESTHESIA PRE PROCEDURE
Department of Anesthesiology  Preprocedure Note       Name:  Kanu Collins   Age:  33 y.o.  :  1990                                          MRN:  [id number]         Date:  2024      Surgeon: Surgeon(s):  Sukumar Osorio MD    Procedure: Procedure(s):  EXCISION SACRAL PILONIDAL CYST & SINUS INFECTION WITH FLAP CLOSURE    Medications prior to admission:   Prior to Admission medications    Medication Sig Start Date End Date Taking? Authorizing Provider   NONFORMULARY Pt currently taking an antibiotic that was rx by surgeon--pt unsure of name/dosage   Yes Provider, MD Khanh       Current medications:    Current Facility-Administered Medications   Medication Dose Route Frequency Provider Last Rate Last Admin    lidocaine 1 % injection 1 mL  1 mL IntraDERmal Once PRN Shalom Pickard MD        fentaNYL (SUBLIMAZE) injection 25 mcg  25 mcg IntraVENous Once PRN Shalom Pickard MD        Or    fentaNYL (SUBLIMAZE) injection 100 mcg  100 mcg IntraVENous Once PRN Shalom Pickard MD        lactated ringers IV soln infusion   IntraVENous Continuous Shalom Pickard MD        sodium chloride flush 0.9 % injection 5-40 mL  5-40 mL IntraVENous 2 times per day Shalom Pickard MD        sodium chloride flush 0.9 % injection 5-40 mL  5-40 mL IntraVENous PRN Shalom Pickard MD        0.9 % sodium chloride infusion   IntraVENous PRN Shalom Pickard MD        midazolam PF (VERSED) injection 2 mg  2 mg IntraVENous Once PRN Shalom Pickard MD        ceFAZolin (ANCEF) 2000 mg in sterile water 20 mL IV syringe  2,000 mg IntraVENous On Call to OR Sukumar Osorio MD        sodium chloride flush 0.9 % injection 5-40 mL  5-40 mL IntraVENous 2 times per day Sukumar Osorio MD        sodium chloride flush 0.9 % injection 5-40 mL  5-40 mL IntraVENous PRN Sukumar Osorio MD        0.9 % sodium chloride infusion   IntraVENous PRN Sukumar Osorio MD           Allergies:  No Known Allergies    Problem List:    Patient Active

## 2024-02-27 NOTE — OP NOTE
[address]  [address]                            OPERATIVE REPORT      PATIENT NAME: MARTI AGUILAR               : 1990  MED REC NO: [id number]                       ROOM: OPOR  ACCOUNT NO: [id number]                       ADMIT DATE: 2024  PROVIDER: Sukumar Osorio MD    DATE OF SERVICE:  2024    PREOPERATIVE DIAGNOSES:  Chronic sacral pilonidal cyst and sinus disease with chronic infections.  POSTOPERATIVE DIAGNOSES:  Chronic sacral pilonidal cyst and sinus disease with chronic infections.  PROCEDURES PERFORMED:  Excision of sacral pilonidal cyst and sinus disease with advancement flap closure of a primary and secondary wound defect of 140 sq cm (CPT 31085, 03141+ listed 3 times).    SURGEON:  Sukumar Osorio MD    ASSISTANT:  None.    ANESTHESIA:  General.    ESTIMATED BLOOD LOSS:  Less than 40 mL.    SPECIMENS REMOVED:  Excised sacral pilonidal cyst and sinus disease, sent to pathology for review.    INTRAOPERATIVE FINDINGS:  see below   COMPLICATIONS:  None.    IMPLANTS:  None.      DESCRIPTION OF PROCEDURE:  The patient was taken to the operating room and placed in the supine position.  After adequate anesthesia was given, he was repositioned prone.  His buttocks were taped open for exposure.  His sacral midline and the buttocks were prepped and draped in a sterile fashion with multiple layers of ChloraPrep.  Time-out protocol was followed.  He was given prophylactic antibiotics.  A lacrimal probe was used to introduce into the sinus tracts to  the course and trajectory of each.  He had a large chronic granuloma to the left of midline cephalad.  A josue shaped incision was made to include the entire midline gluteal cleft region with all the sinus tracts as well as the granuloma at the apex of the incision.  We dissected through the subcutaneous tissues all the way to the sacral fascia.  A large specimen

## 2024-03-06 RX ORDER — DOXYCYCLINE 100 MG/1
100 TABLET ORAL EVERY 12 HOURS
Qty: 20 TABLET | Refills: 0 | Status: SHIPPED | OUTPATIENT
Start: 2024-03-06 | End: 2024-03-16

## 2024-03-11 ENCOUNTER — OFFICE VISIT (OUTPATIENT)
Dept: SURGERY | Age: 34
End: 2024-03-11

## 2024-03-11 VITALS — BODY MASS INDEX: 32.64 KG/M2 | WEIGHT: 228 LBS | HEIGHT: 70 IN

## 2024-03-11 DIAGNOSIS — L98.8 PILONIDAL DISEASE: Primary | ICD-10-CM

## 2024-03-11 PROCEDURE — 99024 POSTOP FOLLOW-UP VISIT: CPT | Performed by: SURGERY

## 2024-03-11 NOTE — PROGRESS NOTES
H&P/Consult Note/Progress Note/Office Note:   Kanu Collins  MRN: [id number]  :1990  Age:33 y.o.    HPI: Kanu Collins is a 33 y.o. male who is s/p sacral pilonidal cyst ad sinus excision with advancement flap closure on 24.    POD13    Prior to surgery he was initially referred by Dr. Marlon Ramos for evaluation of sacral pilonidal cyst and sinus disease.  He reported a long h/o intermittent episodes of sacral pilonidal cyst and sinus inflammation.  No prior pilonidal I&D procedures or resections.  He noticed acute worsening of his condition with swelling in late 2023.  He was placed on Bactrim for acne and remained on that as of his first visit with us on 23.  He also has an umbilical hernia which is stable    23 no show for office visit  23 no show for office visit    24 office visit. No prior umbilical repairs. Sacral pilonidal disease persists   He had prior BIH repairs as an infant in [place]     24 OR for excision  3/11/24 office visit          Past Medical History:   Diagnosis Date    Acne     Dr. Ramos    Pilonidal cyst      Past Surgical History:   Procedure Laterality Date    HERNIA REPAIR Bilateral     PILONIDAL CYST EXCISION N/A 2024    EXCISION SACRAL PILONIDAL CYST & SINUS INFECTION WITH FLAP CLOSURE performed by Sukumar Osorio MD at [facility]     Current Outpatient Medications   Medication Sig    doxycycline monohydrate (ADOXA) 100 MG tablet Take 1 tablet by mouth in the morning and 1 tablet in the evening. Do all this for 10 days.     No current facility-administered medications for this visit.     ALLERGIES:  Patient has no known allergies.    Social History     Socioeconomic History    Marital status:    Tobacco Use    Smoking status: Never     Passive exposure: Never    Smokeless tobacco: Never   Vaping Use    Vaping Use: Never used   Substance and Sexual Activity    Alcohol use: Yes     Alcohol/week: 9.0 standard drinks of alcohol

## 2024-03-20 ENCOUNTER — OFFICE VISIT (OUTPATIENT)
Dept: SURGERY | Age: 34
End: 2024-03-20

## 2024-03-20 DIAGNOSIS — L05.91 PILONIDAL CYST: Primary | ICD-10-CM

## 2024-03-20 DIAGNOSIS — L98.8 PILONIDAL DISEASE: ICD-10-CM

## 2024-03-20 PROCEDURE — 99024 POSTOP FOLLOW-UP VISIT: CPT | Performed by: SURGERY

## 2024-03-20 NOTE — PROGRESS NOTES
Social Determinants of Health     Financial Resource Strain: Low Risk  (6/19/2023)    Overall Financial Resource Strain (CARDIA)     Difficulty of Paying Living Expenses: Not hard at all   Transportation Needs: Unknown (6/19/2023)    PRAPARE - Transportation     Lack of Transportation (Non-Medical): No   Housing Stability: Unknown (6/19/2023)    Housing Stability Vital Sign     Unstable Housing in the Last Year: No     Social History     Tobacco Use   Smoking Status Never    Passive exposure: Never   Smokeless Tobacco Never     Family History   Problem Relation Age of Onset    Hypertension Mother     Diabetes Father     Parkinson's Disease Father     Coronary Art Dis Father     No Known Problems Brother     Asthma Maternal Grandfather         lung    Cancer Maternal Aunt         breast    Diabetes Half-Brother     No Known Problems Half-Brother      ROS: The patient has no difficulty with chest pain or shortness of breath.  No fever or chills.  Comprehensive review of systems was otherwise unremarkable except as noted above.    Physical Exam:   There were no vitals taken for this visit.  There were no vitals filed for this visit.        @[facility]@  [unfilled]    Constitutional: Alert, oriented, cooperative patient in no acute distress; appears stated age    Eyes:Sclera are clear. EOMs intact  ENMT: no external lesions gross hearing normal; no obvious neck masses, no ear or lip lesions, nares normal  CV: RRR. Normal perfusion  Resp: No JVD.  Breathing is  non-labored; no audible wheezing.    GI: soft and non-distended      He has a small umbilical hernia which is reducible.  He has an area of folliculitis about 2 cm inferior to the umbilicus    Healing sacral pilonidal cyst and sinus incision  Some serous drainage (a few drops) at apex  No cellulitis       Musculoskeletal: unremarkable with normal function. No embolic signs or cyanosis.   Neuro:  Oriented; moves all 4; no focal deficits  Psychiatric: normal

## 2024-03-27 ENCOUNTER — OFFICE VISIT (OUTPATIENT)
Dept: SURGERY | Age: 34
End: 2024-03-27

## 2024-03-27 VITALS — HEIGHT: 70 IN | WEIGHT: 228 LBS | BODY MASS INDEX: 32.64 KG/M2

## 2024-03-27 DIAGNOSIS — L05.91 PILONIDAL CYST: Primary | ICD-10-CM

## 2024-03-27 PROCEDURE — 99024 POSTOP FOLLOW-UP VISIT: CPT | Performed by: SURGERY

## 2024-04-03 ENCOUNTER — OFFICE VISIT (OUTPATIENT)
Dept: SURGERY | Age: 34
End: 2024-04-03

## 2024-04-03 VITALS — BODY MASS INDEX: 32.71 KG/M2 | HEIGHT: 70 IN

## 2024-04-03 DIAGNOSIS — L98.8 PILONIDAL DISEASE: Primary | ICD-10-CM

## 2024-04-03 NOTE — PROGRESS NOTES
(not separately reported);     or  Category 3: Discussion of management or test interpretation  ?Discussion of management or test interpretation with external physician/other qualified health care professional/appropriate source (not separately reported)   High risk of morbidity from additional diagnostic testing or treatment  Examples only:  ?Drug therapy requiring intensive monitoring for toxicity  ?Decision regarding elective major surgery with identified patient or procedure risk factors  ?Decision regarding emergency major surgery  ?Decision regarding hospitalization  ?Decision not to resuscitate or to de-escalate care because of poor prognosis      Parenteral controlled substances             I have personally performed a face-to-face diagnostic evaluation and management  service on this patient.      I have independently seen the patient.   I have independently obtained the above history from the patient/family.    I have independently examined the patient with above findings.  I have independently reviewed data/labs for this patient and developed the above plan of care (MDM).      Signed: GUILLE SANDERSON - CNP  4/3/2024    9:29 AM

## 2024-04-16 NOTE — PROGRESS NOTES
elective major surgery without identified patient or procedure risk factors   ?Diagnosis or treatment significantly limited by social determinants of health       49848  93151 High High  ?1or more chronic illnesses with severe exacerbation, progression, or side effects of treatment;    or  ?1 acute or chronic illness or injury that poses a threat to life or bodily function   Extensive  (Must meet the requirements of at least 2 out of 3 categories)  Category 1: Tests, documents, or independent historian(s)  ?Any combination of 3 from the following:   ?Review of prior external note(s) from each unique source*;  ?Review of the result(s) of each unique test*;   ?Ordering of each unique test*;   ?Assessment requiring an independent historian(s)    or   Category 2: Independent interpretation of tests   ?Independent interpretation of a test performed by another physician/other qualified health care professional (not separately reported);     or  Category 3: Discussion of management or test interpretation  ?Discussion of management or test interpretation with external physician/other qualified health care professional/appropriate source (not separately reported)   High risk of morbidity from additional diagnostic testing or treatment  Examples only:  ?Drug therapy requiring intensive monitoring for toxicity  ?Decision regarding elective major surgery with identified patient or procedure risk factors  ?Decision regarding emergency major surgery  ?Decision regarding hospitalization  ?Decision not to resuscitate or to de-escalate care because of poor prognosis      Parenteral controlled substances             I have personally performed a face-to-face diagnostic evaluation and management  service on this patient.      I have independently seen the patient.   I have independently obtained the above history from the patient/family.    I have independently examined the patient with above findings.  I have independently reviewed

## 2024-04-17 ENCOUNTER — OFFICE VISIT (OUTPATIENT)
Dept: SURGERY | Age: 34
End: 2024-04-17

## 2024-04-17 DIAGNOSIS — L05.91 PILONIDAL CYST: Primary | ICD-10-CM

## 2024-04-17 PROCEDURE — 99024 POSTOP FOLLOW-UP VISIT: CPT | Performed by: SURGERY

## 2024-07-15 ASSESSMENT — PATIENT HEALTH QUESTIONNAIRE - PHQ9
1. LITTLE INTEREST OR PLEASURE IN DOING THINGS: NOT AT ALL
SUM OF ALL RESPONSES TO PHQ QUESTIONS 1-9: 0
SUM OF ALL RESPONSES TO PHQ QUESTIONS 1-9: 0
2. FEELING DOWN, DEPRESSED OR HOPELESS: NOT AT ALL
SUM OF ALL RESPONSES TO PHQ QUESTIONS 1-9: 0
SUM OF ALL RESPONSES TO PHQ9 QUESTIONS 1 & 2: 0
SUM OF ALL RESPONSES TO PHQ9 QUESTIONS 1 & 2: 0
SUM OF ALL RESPONSES TO PHQ QUESTIONS 1-9: 0
1. LITTLE INTEREST OR PLEASURE IN DOING THINGS: NOT AT ALL
2. FEELING DOWN, DEPRESSED OR HOPELESS: NOT AT ALL

## 2024-07-16 ENCOUNTER — OFFICE VISIT (OUTPATIENT)
Dept: FAMILY MEDICINE CLINIC | Facility: CLINIC | Age: 34
End: 2024-07-16
Payer: COMMERCIAL

## 2024-07-16 VITALS
RESPIRATION RATE: 14 BRPM | WEIGHT: 232 LBS | HEIGHT: 70 IN | OXYGEN SATURATION: 97 % | SYSTOLIC BLOOD PRESSURE: 102 MMHG | TEMPERATURE: 97.6 F | BODY MASS INDEX: 33.21 KG/M2 | DIASTOLIC BLOOD PRESSURE: 74 MMHG | HEART RATE: 58 BPM

## 2024-07-16 DIAGNOSIS — Z00.00 WELL ADULT HEALTH CHECK: Primary | ICD-10-CM

## 2024-07-16 DIAGNOSIS — E78.00 ELEVATED LDL CHOLESTEROL LEVEL: ICD-10-CM

## 2024-07-16 DIAGNOSIS — R10.13 DYSPEPSIA: ICD-10-CM

## 2024-07-16 DIAGNOSIS — L73.2 AXILLARY HIDRADENITIS SUPPURATIVA: ICD-10-CM

## 2024-07-16 DIAGNOSIS — Z82.49 FAMILY HISTORY OF CORONARY ARTERY DISEASE IN FATHER: ICD-10-CM

## 2024-07-16 DIAGNOSIS — E66.09 CLASS 1 OBESITY DUE TO EXCESS CALORIES WITHOUT SERIOUS COMORBIDITY WITH BODY MASS INDEX (BMI) OF 33.0 TO 33.9 IN ADULT: ICD-10-CM

## 2024-07-16 DIAGNOSIS — K42.9 UMBILICAL HERNIA WITHOUT OBSTRUCTION AND WITHOUT GANGRENE: ICD-10-CM

## 2024-07-16 PROBLEM — L02.31 CELLULITIS AND ABSCESS OF BUTTOCK: Status: RESOLVED | Noted: 2023-07-12 | Resolved: 2024-07-16

## 2024-07-16 PROBLEM — L03.317 CELLULITIS AND ABSCESS OF BUTTOCK: Status: RESOLVED | Noted: 2023-07-12 | Resolved: 2024-07-16

## 2024-07-16 PROBLEM — L70.0 ACNE VULGARIS: Status: RESOLVED | Noted: 2023-06-19 | Resolved: 2024-07-16

## 2024-07-16 PROBLEM — L05.91 PILONIDAL CYST: Status: RESOLVED | Noted: 2023-06-19 | Resolved: 2024-07-16

## 2024-07-16 PROBLEM — L98.8 PILONIDAL DISEASE: Status: RESOLVED | Noted: 2024-01-22 | Resolved: 2024-07-16

## 2024-07-16 LAB
ALBUMIN SERPL-MCNC: 3.8 G/DL (ref 3.5–5)
ALBUMIN/GLOB SERPL: 1.2 (ref 1–1.9)
ALP SERPL-CCNC: 61 U/L (ref 40–129)
ALT SERPL-CCNC: 29 U/L (ref 12–65)
ANION GAP SERPL CALC-SCNC: 9 MMOL/L (ref 9–18)
AST SERPL-CCNC: 23 U/L (ref 15–37)
BASOPHILS # BLD: 0 K/UL (ref 0–0.2)
BASOPHILS NFR BLD: 0 % (ref 0–2)
BILIRUB SERPL-MCNC: 0.9 MG/DL (ref 0–1.2)
BUN SERPL-MCNC: 10 MG/DL (ref 6–23)
CALCIUM SERPL-MCNC: 9.1 MG/DL (ref 8.8–10.2)
CHLORIDE SERPL-SCNC: 105 MMOL/L (ref 98–107)
CHOLEST SERPL-MCNC: 181 MG/DL (ref 0–200)
CO2 SERPL-SCNC: 27 MMOL/L (ref 20–28)
CREAT SERPL-MCNC: 1.09 MG/DL (ref 0.8–1.3)
DIFFERENTIAL METHOD BLD: NORMAL
EOSINOPHIL # BLD: 0.1 K/UL (ref 0–0.8)
EOSINOPHIL NFR BLD: 1 % (ref 0.5–7.8)
ERYTHROCYTE [DISTWIDTH] IN BLOOD BY AUTOMATED COUNT: 12.4 % (ref 11.9–14.6)
GLOBULIN SER CALC-MCNC: 3.2 G/DL (ref 2.3–3.5)
GLUCOSE SERPL-MCNC: 104 MG/DL (ref 70–99)
HCT VFR BLD AUTO: 47.9 % (ref 41.1–50.3)
HDLC SERPL-MCNC: 38 MG/DL (ref 40–60)
HDLC SERPL: 4.7 (ref 0–5)
HGB BLD-MCNC: 15.3 G/DL (ref 13.6–17.2)
IMM GRANULOCYTES # BLD AUTO: 0 K/UL (ref 0–0.5)
IMM GRANULOCYTES NFR BLD AUTO: 1 % (ref 0–5)
LDLC SERPL CALC-MCNC: 126 MG/DL (ref 0–100)
LYMPHOCYTES # BLD: 1.4 K/UL (ref 0.5–4.6)
LYMPHOCYTES NFR BLD: 27 % (ref 13–44)
MCH RBC QN AUTO: 28.2 PG (ref 26.1–32.9)
MCHC RBC AUTO-ENTMCNC: 31.9 G/DL (ref 31.4–35)
MCV RBC AUTO: 88.2 FL (ref 82–102)
MONOCYTES # BLD: 0.5 K/UL (ref 0.1–1.3)
MONOCYTES NFR BLD: 9 % (ref 4–12)
NEUTS SEG # BLD: 3.2 K/UL (ref 1.7–8.2)
NEUTS SEG NFR BLD: 62 % (ref 43–78)
NRBC # BLD: 0 K/UL (ref 0–0.2)
PLATELET # BLD AUTO: 337 K/UL (ref 150–450)
PMV BLD AUTO: 10.3 FL (ref 9.4–12.3)
POTASSIUM SERPL-SCNC: 4.3 MMOL/L (ref 3.5–5.1)
PROT SERPL-MCNC: 7 G/DL (ref 6.3–8.2)
RBC # BLD AUTO: 5.43 M/UL (ref 4.23–5.6)
SODIUM SERPL-SCNC: 141 MMOL/L (ref 136–145)
TRIGL SERPL-MCNC: 84 MG/DL (ref 0–150)
VLDLC SERPL CALC-MCNC: 17 MG/DL (ref 6–23)
WBC # BLD AUTO: 5.2 K/UL (ref 4.3–11.1)

## 2024-07-16 PROCEDURE — 99395 PREV VISIT EST AGE 18-39: CPT | Performed by: FAMILY MEDICINE

## 2024-07-16 SDOH — ECONOMIC STABILITY: FOOD INSECURITY: WITHIN THE PAST 12 MONTHS, YOU WORRIED THAT YOUR FOOD WOULD RUN OUT BEFORE YOU GOT MONEY TO BUY MORE.: NEVER TRUE

## 2024-07-16 SDOH — ECONOMIC STABILITY: FOOD INSECURITY: WITHIN THE PAST 12 MONTHS, THE FOOD YOU BOUGHT JUST DIDN'T LAST AND YOU DIDN'T HAVE MONEY TO GET MORE.: NEVER TRUE

## 2024-07-16 SDOH — ECONOMIC STABILITY: INCOME INSECURITY: HOW HARD IS IT FOR YOU TO PAY FOR THE VERY BASICS LIKE FOOD, HOUSING, MEDICAL CARE, AND HEATING?: NOT HARD AT ALL

## 2024-07-16 NOTE — PROGRESS NOTES
[facility] [place] [address]  Phone: [phone] Fax [phone]  Deni Scott M.D.  7/16/2024        Kanu Collins is a 34 y.o. male     HPI:  Patient is seen for Follow-up Chronic Condition (1 year f/u non fasting)  Patient comes in today for physical exam having drank a few ounces of tea this morning.  Overall reports doing fairly well.  No current routine exercise and has noticed some mild weight gain.  Last year patient went pilonidal cyst surgical removal.  Has not had final follow-up with his surgeon as of yet.  Continues to have the umbilical herniation without associated discomfort.    Previously patient had noted mildly elevated LDL cholesterol.  Has family history of father having passed with MI in his 60s.  He does report heartburn/reflux potentially on the border of twice weekly or more.  After the week of July 4 he has chosen not to drink any alcohol for the remainder of the month.  Has noticed that he has a little more dyspepsia/reflux especially when lying down at night if he has had alcohol intake.    Has been about 10 years since patient has seen dermatology with history of axillary hidradenitis.  Has not had significantly painful lesions recently.  Does have history of folliculitis and acne vulgaris.  Would potentially like to be established with a dermatologist in the area.       ROS:  Constitutional: denies excessive fatigue; no fever or chills; no polydipsia polyphagia or polyuria  HEENT: no nasal pressure or drainage, no otalgia or tinnitus or decreased hearing   Pulmonary: no dyspnea cough or wheeze  Cardiac: no chest pain or palpitations, no tachycardia, no PND or orthopnea.  GI: normal bowel movements, no nausea or vomiting or diarrhea, no hematochezia or melena, no abdominal pain  : normal urination without dysuria or hematuria, nocturia or urinary hesitancy; no significant incontinence  MSK: no significant myalgias or

## 2024-07-17 NOTE — RESULT ENCOUNTER NOTE
Call back cholesterol just mildly increased at 126 ideally with this to be less than 100.  HDL, good cholesterol, low at 38 with goal greater than 40.  Watch cholesterol/saturated fats in diet and increase aerobic activity to improve these.  Glucose just minimally increased at 104; avoid excessive sweets and excessive simple carbohydrates.  Electrolytes and kidney function and liver function normal.  White blood cell count hemoglobin and platelets normal.

## 2024-12-02 ENCOUNTER — OFFICE VISIT (OUTPATIENT)
Dept: FAMILY MEDICINE CLINIC | Facility: CLINIC | Age: 34
End: 2024-12-02

## 2024-12-02 VITALS
SYSTOLIC BLOOD PRESSURE: 143 MMHG | TEMPERATURE: 97.6 F | BODY MASS INDEX: 32.78 KG/M2 | OXYGEN SATURATION: 99 % | HEIGHT: 70 IN | RESPIRATION RATE: 16 BRPM | WEIGHT: 229 LBS | HEART RATE: 80 BPM | DIASTOLIC BLOOD PRESSURE: 80 MMHG

## 2024-12-02 DIAGNOSIS — S91.209A AVULSION OF TOENAIL, INITIAL ENCOUNTER: Primary | ICD-10-CM

## 2024-12-02 DIAGNOSIS — S91.202A TRAUMATIC LOSS OF TOENAIL OF LEFT GREAT TOE, INITIAL ENCOUNTER: ICD-10-CM

## 2024-12-02 ASSESSMENT — PATIENT HEALTH QUESTIONNAIRE - PHQ9
1. LITTLE INTEREST OR PLEASURE IN DOING THINGS: NOT AT ALL
SUM OF ALL RESPONSES TO PHQ QUESTIONS 1-9: 0
2. FEELING DOWN, DEPRESSED OR HOPELESS: NOT AT ALL
SUM OF ALL RESPONSES TO PHQ9 QUESTIONS 1 & 2: 0

## 2024-12-02 NOTE — PROGRESS NOTES
tomorrow morning and then twice daily.  May apply Vaseline to the nailbed surface or triple antibiotic ointment.  Should keep this moist and covered twice daily until healed.  He will wish to elevate the toe above the level of the heart today to reduce discomfort.  Redness will appear around the toe at the nail extraction site but if streaking occurs, may need an oral antibiotic and he will notify us.  Patient understands that a new nail may take up to 6 months for growth.  Plan follow-up here in July 2025 as previously scheduled for physical.       Provided educational information on the following:      Discharge Meds:  No current outpatient medications on file.     No current facility-administered medications for this visit.         Return in 8 months (on 7/22/2025) for CPX as planned.        Any new medications were explained today including any potential drug interactions or significant side effects.  The patient's questions in regards to this were answered today.    Dictated using voice recognition software.  Proofread, but unrecognized errors may exist.

## 2025-07-22 ENCOUNTER — OFFICE VISIT (OUTPATIENT)
Dept: FAMILY MEDICINE CLINIC | Facility: CLINIC | Age: 35
End: 2025-07-22
Payer: COMMERCIAL

## 2025-07-22 VITALS
TEMPERATURE: 98.1 F | DIASTOLIC BLOOD PRESSURE: 82 MMHG | BODY MASS INDEX: 32.96 KG/M2 | RESPIRATION RATE: 18 BRPM | HEIGHT: 70 IN | WEIGHT: 230.2 LBS | HEART RATE: 58 BPM | OXYGEN SATURATION: 97 % | SYSTOLIC BLOOD PRESSURE: 118 MMHG

## 2025-07-22 DIAGNOSIS — R10.13 DYSPEPSIA: ICD-10-CM

## 2025-07-22 DIAGNOSIS — E66.09 CLASS 1 OBESITY DUE TO EXCESS CALORIES WITHOUT SERIOUS COMORBIDITY WITH BODY MASS INDEX (BMI) OF 33.0 TO 33.9 IN ADULT: ICD-10-CM

## 2025-07-22 DIAGNOSIS — L73.2 AXILLARY HIDRADENITIS SUPPURATIVA: ICD-10-CM

## 2025-07-22 DIAGNOSIS — E78.00 ELEVATED LDL CHOLESTEROL LEVEL: ICD-10-CM

## 2025-07-22 DIAGNOSIS — R73.09 ELEVATED GLUCOSE: ICD-10-CM

## 2025-07-22 DIAGNOSIS — E66.811 CLASS 1 OBESITY DUE TO EXCESS CALORIES WITHOUT SERIOUS COMORBIDITY WITH BODY MASS INDEX (BMI) OF 33.0 TO 33.9 IN ADULT: ICD-10-CM

## 2025-07-22 DIAGNOSIS — Z00.00 ANNUAL PHYSICAL EXAM: Primary | ICD-10-CM

## 2025-07-22 LAB
BASOPHILS # BLD: 0.02 K/UL (ref 0–0.2)
BASOPHILS NFR BLD: 0.3 % (ref 0–2)
DIFFERENTIAL METHOD BLD: NORMAL
EOSINOPHIL # BLD: 0.11 K/UL (ref 0–0.8)
EOSINOPHIL NFR BLD: 1.8 % (ref 0.5–7.8)
ERYTHROCYTE [DISTWIDTH] IN BLOOD BY AUTOMATED COUNT: 12.6 % (ref 11.9–14.6)
HCT VFR BLD AUTO: 47 % (ref 41.1–50.3)
HGB BLD-MCNC: 15 G/DL (ref 13.6–17.2)
IMM GRANULOCYTES # BLD AUTO: 0.04 K/UL (ref 0–0.5)
IMM GRANULOCYTES NFR BLD AUTO: 0.7 % (ref 0–5)
LYMPHOCYTES # BLD: 1.61 K/UL (ref 0.5–4.6)
LYMPHOCYTES NFR BLD: 26.8 % (ref 13–44)
MCH RBC QN AUTO: 28.1 PG (ref 26.1–32.9)
MCHC RBC AUTO-ENTMCNC: 31.9 G/DL (ref 31.4–35)
MCV RBC AUTO: 88 FL (ref 82–102)
MONOCYTES # BLD: 0.59 K/UL (ref 0.1–1.3)
MONOCYTES NFR BLD: 9.8 % (ref 4–12)
NEUTS SEG # BLD: 3.64 K/UL (ref 1.7–8.2)
NEUTS SEG NFR BLD: 60.6 % (ref 43–78)
NRBC # BLD: 0 K/UL (ref 0–0.2)
PLATELET # BLD AUTO: 333 K/UL (ref 150–450)
PMV BLD AUTO: 10.1 FL (ref 9.4–12.3)
RBC # BLD AUTO: 5.34 M/UL (ref 4.23–5.6)
WBC # BLD AUTO: 6 K/UL (ref 4.3–11.1)

## 2025-07-22 PROCEDURE — 99395 PREV VISIT EST AGE 18-39: CPT | Performed by: FAMILY MEDICINE

## 2025-07-22 SDOH — ECONOMIC STABILITY: FOOD INSECURITY: WITHIN THE PAST 12 MONTHS, THE FOOD YOU BOUGHT JUST DIDN'T LAST AND YOU DIDN'T HAVE MONEY TO GET MORE.: NEVER TRUE

## 2025-07-22 SDOH — ECONOMIC STABILITY: FOOD INSECURITY: WITHIN THE PAST 12 MONTHS, YOU WORRIED THAT YOUR FOOD WOULD RUN OUT BEFORE YOU GOT MONEY TO BUY MORE.: NEVER TRUE

## 2025-07-22 ASSESSMENT — PATIENT HEALTH QUESTIONNAIRE - PHQ9
1. LITTLE INTEREST OR PLEASURE IN DOING THINGS: NOT AT ALL
SUM OF ALL RESPONSES TO PHQ QUESTIONS 1-9: 0
2. FEELING DOWN, DEPRESSED OR HOPELESS: NOT AT ALL
SUM OF ALL RESPONSES TO PHQ QUESTIONS 1-9: 0
SUM OF ALL RESPONSES TO PHQ QUESTIONS 1-9: 0
SUM OF ALL RESPONSES TO PHQ9 QUESTIONS 1 & 2: 0
1. LITTLE INTEREST OR PLEASURE IN DOING THINGS: NOT AT ALL
SUM OF ALL RESPONSES TO PHQ QUESTIONS 1-9: 0
2. FEELING DOWN, DEPRESSED OR HOPELESS: NOT AT ALL

## 2025-07-22 NOTE — PROGRESS NOTES
[facility] [place] [place] [address]  Phone: [phone] Fax [phone]  Deni Scott M.D.  7/22/2025        Kanu Collins is a 35 y.o. male     HPI:  Patient is seen for Annual Exam (Here for annual physical - fasting )     Patient comes in fasting today for physical.  Overall feels fairly well.  He just drove with his wife and children to [place] and then to [place] before flying back.  He reports having about 5 weeks before his wife gets back into town.  Plans on focusing on dietary and exercise regimens in order to improve his wellbeing/fitness.  He has downloaded an gene that involves resistance exercise with low weights.    He does describe some dyspepsia but states this has improved some since avoiding sodas and decreasing greasy foods in his diet over the last couple of weeks.  Uncertain if having symptoms twice weekly or more.  Has on occasion taken some famotidine in the past with benefit.    States that he was referred to dermatology last year but never made it for evaluation but would like this rescheduled as he has discomfort in the axillary regions with known hidradenitis suppurativa.    Has had notation for some LDL elevation and mild glucose elevation in the past.    ROS:  Constitutional: denies significant weight changes or excessive fatigue; no fever or chills; no polydipsia polyphagia or polyuria  HEENT: no nasal pressure or drainage, no otalgia or tinnitus or decreased hearing   Pulmonary: no dyspnea cough or wheeze  Cardiac: no chest pain or palpitations, no tachycardia, no PND or orthopnea.  GI: normal bowel movements, no nausea or vomiting or diarrhea, no hematochezia or melena, no abdominal pain  : normal urination without dysuria or hematuria, nocturia or urinary hesitancy; no significant incontinence  MSK: no significant myalgias or arthralgias.  Neurological: no memory loss or trouble with balance or falls, no numbness or tingling

## 2025-07-23 LAB
ALBUMIN SERPL-MCNC: 3.8 G/DL (ref 3.5–5)
ALBUMIN/GLOB SERPL: 1.1 (ref 1–1.9)
ALP SERPL-CCNC: 58 U/L (ref 40–129)
ALT SERPL-CCNC: 30 U/L (ref 8–55)
ANION GAP SERPL CALC-SCNC: 15 MMOL/L (ref 7–16)
AST SERPL-CCNC: 25 U/L (ref 15–37)
BILIRUB SERPL-MCNC: 1.6 MG/DL (ref 0–1.2)
BUN SERPL-MCNC: 11 MG/DL (ref 6–23)
CALCIUM SERPL-MCNC: 9.3 MG/DL (ref 8.8–10.2)
CHLORIDE SERPL-SCNC: 102 MMOL/L (ref 98–107)
CHOLEST SERPL-MCNC: 187 MG/DL (ref 0–200)
CO2 SERPL-SCNC: 23 MMOL/L (ref 20–29)
CREAT SERPL-MCNC: 1.14 MG/DL (ref 0.8–1.3)
GLOBULIN SER CALC-MCNC: 3.5 G/DL (ref 2.3–3.5)
GLUCOSE SERPL-MCNC: 92 MG/DL (ref 70–99)
HDLC SERPL-MCNC: 43 MG/DL (ref 40–60)
HDLC SERPL: 4.4 (ref 0–5)
LDLC SERPL CALC-MCNC: 123 MG/DL (ref 0–100)
POTASSIUM SERPL-SCNC: 4.1 MMOL/L (ref 3.5–5.1)
PROT SERPL-MCNC: 7.2 G/DL (ref 6.3–8.2)
SODIUM SERPL-SCNC: 140 MMOL/L (ref 136–145)
TRIGL SERPL-MCNC: 107 MG/DL (ref 0–150)
VLDLC SERPL CALC-MCNC: 21 MG/DL (ref 6–23)

## 2025-07-25 DIAGNOSIS — Z00.00 ANNUAL PHYSICAL EXAM: ICD-10-CM

## 2025-07-25 DIAGNOSIS — R73.09 ELEVATED GLUCOSE: ICD-10-CM

## 2025-07-25 LAB
EST. AVERAGE GLUCOSE BLD GHB EST-MCNC: 109 MG/DL
HBA1C MFR BLD: 5.4 % (ref 0–5.6)

## (undated) DEVICE — TUBING, SUCTION, 1/4" X 10', STRAIGHT: Brand: MEDLINE

## (undated) DEVICE — SUTURE ETHLN SZ 2-0 L30IN NONABSORBABLE BLK L36MM FSLX 3/8 1674H

## (undated) DEVICE — GAUZE,SPONGE,4"X4",16PLY,STRL,LF,10/TRAY: Brand: MEDLINE

## (undated) DEVICE — YANKAUER,BULB TIP,W/O VENT,RIGID,STERILE: Brand: MEDLINE

## (undated) DEVICE — GLOVE SURG SZ 7 L12IN FNGR THK79MIL GRN LTX FREE

## (undated) DEVICE — GARMENT,MEDLINE,DVT,INT,CALF,MED, GEN2: Brand: MEDLINE

## (undated) DEVICE — SHEET, T, LAPAROTOMY, STERILE: Brand: MEDLINE

## (undated) DEVICE — BLADE CLIPPER GEN PURP NS

## (undated) DEVICE — 3M™ TEGADERM™ TRANSPARENT FILM DRESSING FRAME STYLE, 1628, 6 IN X 8 IN (15 CM X 20 CM), 10/CT 8CT/CASE: Brand: 3M™ TEGADERM™

## (undated) DEVICE — Device

## (undated) DEVICE — SOLUTION IRRIG 1000ML 0.9% SOD CHL USP POUR PLAS BTL

## (undated) DEVICE — SUTURE ETHLN SZ 2-0 L20IN NONABSORBABLE BLK L75MM LR 3/8 460T

## (undated) DEVICE — PAD,ABDOMINAL,5"X9",ST,LF,25/BX: Brand: MEDLINE INDUSTRIES, INC.

## (undated) DEVICE — SURGICAL PROCEDURE PACK BASIC ST FRANCIS

## (undated) DEVICE — ELECTRODE PT RET AD L9FT HI MOIST COND ADH HYDRGEL CORDED

## (undated) DEVICE — NEEDLE HYPO 21GA L1.5IN INTRAMUSCULAR S STL LATCH BVL UP

## (undated) DEVICE — GLOVE SURG SZ 65 THK91MIL LTX FREE SYN POLYISOPRENE